# Patient Record
Sex: FEMALE | Race: WHITE
[De-identification: names, ages, dates, MRNs, and addresses within clinical notes are randomized per-mention and may not be internally consistent; named-entity substitution may affect disease eponyms.]

---

## 2020-05-22 ENCOUNTER — HOSPITAL ENCOUNTER (OUTPATIENT)
Dept: HOSPITAL 92 - L&D/OP | Age: 40
Discharge: HOME | End: 2020-05-22
Attending: FAMILY MEDICINE
Payer: COMMERCIAL

## 2020-05-22 VITALS — TEMPERATURE: 98.6 F | DIASTOLIC BLOOD PRESSURE: 81 MMHG | SYSTOLIC BLOOD PRESSURE: 134 MMHG

## 2020-05-22 VITALS — BODY MASS INDEX: 36.9 KG/M2

## 2020-05-22 DIAGNOSIS — E66.9: ICD-10-CM

## 2020-05-22 DIAGNOSIS — O99.89: Primary | ICD-10-CM

## 2020-05-22 DIAGNOSIS — Z3A.34: ICD-10-CM

## 2020-05-22 DIAGNOSIS — O99.213: ICD-10-CM

## 2020-05-22 DIAGNOSIS — O09.513: ICD-10-CM

## 2020-05-22 DIAGNOSIS — R03.0: ICD-10-CM

## 2020-05-22 PROCEDURE — 76819 FETAL BIOPHYS PROFIL W/O NST: CPT

## 2020-05-22 NOTE — PDOC.LDPN
Labor & Delivery Progress Note





- Subjective


Subjective: comfortable





- Objective


Vital signs reviewed and normal: yes


General: NAD, resting


-: 





Pt is a 40 yo  at 34.0 wks, dated by a 22.4 wk sono, here 2/2 BPP done 

with MFM, score of 4/8. They recommended her to be seen at the hospital:





# IUP


# AMA


# Late to care


-Pt seen by MFM today and noted a BPP, . Recommended her be seen at hospital 

for NST.


-NST reactive


-Repeat BPP , DIONTE ~7, DVT >2. Cephalic presentation. Discussed findings with 

patient.


-BP has been controlled here without any pressures >140/90. Encouraged patient 

to continue monitoring her BP at home


-Encouraged patient to f/u in clinic in 2 weeks for next appointment/GBS swab. 





Dispo: Due to reactive NST and BPP , patient will be discharged home. RTC 

precautions discussed. Patient agreeable with plan of care. 





PCP: Ruba JOSHUA

## 2020-05-22 NOTE — PDOC.FPROB
FMR OB H&P: HPI





- History of Present Illness


Chief Complaint: sent by [facility] for BPP 


Indentification: 38yo  at 34.0w by 22.4w US


History of Present Illness: 





 at 34.0 wks, LAMAR 7/3/20.  Pt presents to L&D after [facility] appt today were she 

had BPP , off for gross movement and flexion/extensions.  DIONTE: 5.4. [facility] 

recommended she be seen at hospital for NST and if category 1 then follow up 

with repeat BPP.  She reports good FM.  Denies vaginal discharge, vaginal 

bleeding, scotoma, cramping/ctx, LOF, and HA.  Hx of elevated pressures at home 

and monitors pressures at home and wnl.  She does not have a diagnosis of gHTN.

  








Primary Care Physician: 





NICK Yeh MD





FMR OB H&P: Current Pregnancy





- Prenatal Care


: 1


Para: 0


Gestational age: 34.0


Due date: 7/3/20


Dating Criteria: 22.4w US


Course/Complications: 





AMA, Hx of elevated BP but no dx of gHTN or PreE, late to care/poor dating





- OB Labs


Blood type: A


RH: positive


HIV: negative


RPR: negative


HepBsAg: negative


Rubella: immune


Quad screen: negative


H&H: 11.8/34.1


Platelets: 194





- Anatomy Survey


Anatomy survey: 


Normal anatomic Survey. 





FMR OB H&P: History





- Past Medical History


PMH: 





none





- OB History


OB History: 


AMA, hx of high blood pressures w/o diagnosis of gHTN, obesity





- GYN History


GYN History: 





none





- Surgical History


Sx History: 





Knee surgery, wrist surgery





- Social History


Social History: 





Denies smoking, alcohol, drugs





- Family History


Family History: 





non-contributory





FMR OB H&P: Medications





- Current


Home Medications: 


 











 Medication  Instructions  Recorded  Confirmed  Type


 


Omega-3 Fatty Acids/Fish Oil 1 each PO DAILY 20 History





[Omega 3 1,000 mg Softgel]    


 


Prenatal Vitamin 1 tablet PO DAILY 20 History











Allergies/Adverse Reactions: 


 Allergies











Allergy/AdvReac Type Severity Reaction Status Date / Time


 


No Known Allergies Allergy   Unverified 20 15:06














FMR OB H&P: ROS





- Review of Systems


General: denies: fever/chills, weight/appetite/sleep changes


ENT: denies: nasal congestion, rhinorrhea


Cardiovascular: denies: chest pain, palpitation, edema


Gastrointestinal: denies: abdominal pain, indigestion


Genitourinary (Female): denies: incontinence, dysuria, hematuria


Musculoskeletal: denies: pain, stiffness


Neurologic: denies: numbness, syncope


Integumentary: denies: itching, lesions


Psychological: denies: depression, anxiety





FMR OB H&P: Vital Signs





- Maternal


Vital signs: 





R-18, T98.6, BP- 134/78





- Fetal Heart Tones


Baseline: 140


Variability: moderate


Acceleration: absent


Deceleration: absent





FMR OB H&P: Physical Exam





- Physical Exam


General: NAD, awake, alert and oriented


HEENT: PERRLA, EOMI


Neck: FROM, no JVD


Heart: RRR, normal S1/S2, no murmurs/rubs/gallops, no edema


General: CTAB, no respiratory distress, good air movement, no wheezing


Abdomen: soft, gravid, non-tender


Musculoskeletal: pulses present, FROM in all four extremities


Neurological: cranial nerves II through XII intact, sensation to pain,touch and 

proprioception grossly normal


Skin: good tugor, capillary refill <2 seconds


Lymphatic: no purpura, no petechia


Psychiatric: intact recent and remote memory, good judgement and insight





FMR OB H&P: A/P





- Problem List


(1) Primigravida in third trimester


Current Visit: Yes   Status: Acute   Code(s): Z34.03 - ENCNTR FOR SUPRVSN OF 

NORMAL FIRST PREG, THIRD TRIMESTER   





(2) AMA (advanced maternal age) primigravida 35+


Current Visit: Yes   Status: Acute   Code(s): O09.519 - SUPERVISION OF ELDERLY 

PRIMIGRAVIDA, UNSPECIFIED TRIMESTER   





(3) Obesity


Current Visit: Yes   Status: Acute   Code(s): E66.9 - OBESITY, UNSPECIFIED   


Disposition: 





Pt is a 38 yo  at 34.0 wks, dated by a 22.4 wk sono, here secondary to a 

poor BPP done with MFM, . They recommended her to be seen at the hospital:





# IUP


# AMA


# Late to care


- pt seen by MFM today and noted a BPP, . Recommended her be seen at 

hospital for NST. If reactive then will need a BPP 4-6 hours after initial. 

Initial performed at 1200.  If negative they recommend steroids and delivery.  

She is currently doing well without complications.  She has history of elevated 

pressures but after extensive chart review by Dr. Garnica she does not have a 

diagnosis of gHTN. Her pre-e work up in the past is negative.





PCP: Ruba JOSHUA


Discussion: 


Date/Time: 20 1449











This H&P was discussed with  [] and  [] who agree with the above 

documentation and plan.








Addendum - Attending





- Attending Attestation


Date/Time: 20 175





I personally evaluated the patient and discussed the management with Dr. Zarate


I agree with the History, Examination, Assessment and Plan documented above 

with any addition or exceptions noted below - 38 yo female  @ 34 week sent 

by [facility] due to BPP . Was seeing MFM for AMA, obesity, and elevated BP at home 

but all pressures have been normal and growth ultrasound/BPP. Normal growth. 

Denies any ctx, LOF, VB (+)FM. FHTs- 140s/(+) accels/ no decels/ mod variability

- Category 1; [place]- no ctx. A/P: 1) IUP @34 weeks- NST reassuring. Will repeat 

BPP in 4-6 hours from initial one. If improved will d/c home. If BPP 6/10 or 

less will give steroids and move towards delivery.

## 2020-05-22 NOTE — ULT
ULTRASOUND BIOPHYSICAL FETAL PROFILE:

5/22/20

 

HISTORY: 

4-8 BPP earlier today, AMA. 

 

COMPARISON: 

None. 

 

FINDINGS: 

Real time gray scale, color and spectral analysis of the gravid uterus was performed for the evaluati
on of biophysical profile. 

 

Amniotic fluid index measures 7 cm. The heart rate is 123 beats per minute with a single viable intra
uterine pregnancy. The fetal position is vertex and the placenta is anterior. Biophysical profile sco
re is 8/8. 

 

IMPRESSION: 

Biophysical profile score of 8/8. 

 

POS: HOME

## 2020-06-25 ENCOUNTER — HOSPITAL ENCOUNTER (OUTPATIENT)
Dept: HOSPITAL 92 - SCSLAB | Age: 40
Discharge: HOME | End: 2020-06-25
Attending: FAMILY MEDICINE
Payer: COMMERCIAL

## 2020-06-25 DIAGNOSIS — Z11.59: ICD-10-CM

## 2020-06-25 DIAGNOSIS — Z01.812: Primary | ICD-10-CM

## 2020-06-25 PROCEDURE — 87635 SARS-COV-2 COVID-19 AMP PRB: CPT

## 2020-06-25 PROCEDURE — U0003 INFECTIOUS AGENT DETECTION BY NUCLEIC ACID (DNA OR RNA); SEVERE ACUTE RESPIRATORY SYNDROME CORONAVIRUS 2 (SARS-COV-2) (CORONAVIRUS DISEASE [COVID-19]), AMPLIFIED PROBE TECHNIQUE, MAKING USE OF HIGH THROUGHPUT TECHNOLOGIES AS DESCRIBED BY CMS-2020-01-R: HCPCS

## 2020-06-26 ENCOUNTER — HOSPITAL ENCOUNTER (INPATIENT)
Dept: HOSPITAL 92 - L&D/OP | Age: 40
LOS: 3 days | Discharge: HOME | End: 2020-06-29
Attending: FAMILY MEDICINE | Admitting: FAMILY MEDICINE
Payer: COMMERCIAL

## 2020-06-26 VITALS — BODY MASS INDEX: 38.5 KG/M2

## 2020-06-26 DIAGNOSIS — Z3A.38: ICD-10-CM

## 2020-06-26 DIAGNOSIS — O41.03X0: ICD-10-CM

## 2020-06-26 DIAGNOSIS — E66.9: ICD-10-CM

## 2020-06-26 LAB
ALBUMIN SERPL BCG-MCNC: 3.4 G/DL (ref 3.5–5)
ALP SERPL-CCNC: 152 U/L (ref 40–110)
ALT SERPL W P-5'-P-CCNC: 15 U/L (ref 8–55)
ANION GAP SERPL CALC-SCNC: 14 MMOL/L (ref 10–20)
AST SERPL-CCNC: 18 U/L (ref 5–34)
BASOPHILS # BLD AUTO: 0.1 THOU/UL (ref 0–0.2)
BASOPHILS NFR BLD AUTO: 0.8 % (ref 0–1)
BILIRUB SERPL-MCNC: 0.3 MG/DL (ref 0.2–1.2)
BUN SERPL-MCNC: 10 MG/DL (ref 7–18.7)
CALCIUM SERPL-MCNC: 9.1 MG/DL (ref 7.8–10.44)
CHLORIDE SERPL-SCNC: 108 MMOL/L (ref 98–107)
CO2 SERPL-SCNC: 19 MMOL/L (ref 22–29)
CREAT CL PREDICTED SERPL C-G-VRATE: 177 ML/MIN (ref 70–130)
EOSINOPHIL # BLD AUTO: 0.1 THOU/UL (ref 0–0.7)
EOSINOPHIL NFR BLD AUTO: 0.6 % (ref 0–10)
GLOBULIN SER CALC-MCNC: 2.8 G/DL (ref 2.4–3.5)
GLUCOSE SERPL-MCNC: 70 MG/DL (ref 70–105)
HBSAG INDEX: 0.19 S/CO (ref 0–0.99)
HGB BLD-MCNC: 13.9 G/DL (ref 12–16)
LYMPHOCYTES # BLD: 2.3 THOU/UL (ref 1.2–3.4)
LYMPHOCYTES NFR BLD AUTO: 19.4 % (ref 21–51)
MCH RBC QN AUTO: 32.1 PG (ref 27–31)
MCV RBC AUTO: 93.7 FL (ref 78–98)
MDIFF COMPLETE?: YES
MONOCYTES # BLD AUTO: 0.9 THOU/UL (ref 0.11–0.59)
MONOCYTES NFR BLD AUTO: 7.8 % (ref 0–10)
NEUTROPHILS # BLD AUTO: 8.5 THOU/UL (ref 1.4–6.5)
NEUTROPHILS NFR BLD AUTO: 71.4 % (ref 42–75)
PLATELET # BLD AUTO: 149 THOU/UL (ref 130–400)
POLYCHROMASIA BLD QL SMEAR: (no result) (100X)
POTASSIUM SERPL-SCNC: 4.4 MMOL/L (ref 3.5–5.1)
PROT UR-MCNC: (no result) MG/DL (ref 1–14)
RBC # BLD AUTO: 4.34 MILL/UL (ref 4.2–5.4)
SODIUM SERPL-SCNC: 137 MMOL/L (ref 136–145)
SYPHILIS ANTIBODY INDEX: 0.03 S/CO
URATE SERPL-MCNC: 5.1 MG/DL (ref 2.6–6)
WBC # BLD AUTO: 12 THOU/UL (ref 4.8–10.8)

## 2020-06-26 PROCEDURE — 86850 RBC ANTIBODY SCREEN: CPT

## 2020-06-26 PROCEDURE — 86901 BLOOD TYPING SEROLOGIC RH(D): CPT

## 2020-06-26 PROCEDURE — 84156 ASSAY OF PROTEIN URINE: CPT

## 2020-06-26 PROCEDURE — 87635 SARS-COV-2 COVID-19 AMP PRB: CPT

## 2020-06-26 PROCEDURE — 36415 COLL VENOUS BLD VENIPUNCTURE: CPT

## 2020-06-26 PROCEDURE — 86780 TREPONEMA PALLIDUM: CPT

## 2020-06-26 PROCEDURE — 87340 HEPATITIS B SURFACE AG IA: CPT

## 2020-06-26 PROCEDURE — 86900 BLOOD TYPING SEROLOGIC ABO: CPT

## 2020-06-26 PROCEDURE — 82570 ASSAY OF URINE CREATININE: CPT

## 2020-06-26 PROCEDURE — 82805 BLOOD GASES W/O2 SATURATION: CPT

## 2020-06-26 PROCEDURE — 84550 ASSAY OF BLOOD/URIC ACID: CPT

## 2020-06-26 PROCEDURE — 88307 TISSUE EXAM BY PATHOLOGIST: CPT

## 2020-06-26 PROCEDURE — 51702 INSERT TEMP BLADDER CATH: CPT

## 2020-06-26 PROCEDURE — U0003 INFECTIOUS AGENT DETECTION BY NUCLEIC ACID (DNA OR RNA); SEVERE ACUTE RESPIRATORY SYNDROME CORONAVIRUS 2 (SARS-COV-2) (CORONAVIRUS DISEASE [COVID-19]), AMPLIFIED PROBE TECHNIQUE, MAKING USE OF HIGH THROUGHPUT TECHNOLOGIES AS DESCRIBED BY CMS-2020-01-R: HCPCS

## 2020-06-26 PROCEDURE — 80053 COMPREHEN METABOLIC PANEL: CPT

## 2020-06-26 PROCEDURE — 85025 COMPLETE CBC W/AUTO DIFF WBC: CPT

## 2020-06-26 NOTE — PDOC.LDPN
Labor & Delivery Progress Note





- Subjective


Subjective: comfortable





- Objective


Vital signs reviewed and normal: yes


General: NAD, resting


FHT: category 1





- Assessment


(1) High risk pregnancy in primigravida greater than 35 years of age, antepartum


Code(s): O09.519 - SUPERVISION OF ELDERLY PRIMIGRAVIDA, UNSPECIFIED TRIMESTER   

Current Visit: Yes   Status: Acute   





(2) AMA (advanced maternal age) primigravida 35+


Code(s): O09.519 - SUPERVISION OF ELDERLY PRIMIGRAVIDA, UNSPECIFIED TRIMESTER   

Current Visit: No   Status: Acute   


Qualifiers: 


   Trimester: third trimester   Qualified Code(s): O09.513 - Supervision of 

elderly primigravida, third trimester   





(3) Gestational [pregnancy-induced] hypertension without significant proteinuria

, third trimester


Code(s): O13.3 - GESTATIONAL HTN W/O SIGNIFICANT PROTEINURIA, THIRD TRIMESTER   

Current Visit: Yes   Status: Acute   





(4) Oligohydramnios


Code(s): O41.00X0 - OLIGOHYDRAMNIOS, UNSP TRIMESTER, NOT APPLICABLE OR UNSP   

Current Visit: Yes   Status: Acute   


Qualifiers: 


   Fetus number: single or unspecified fetus   Trimester: third trimester   

Qualified Code(s): O41.03X0 - Oligohydramnios, third trimester, not applicable 

or unspecified   


Plan: continue plan of care


-: 





Cat 1 strip at 2130.  Cytotec placed at 1930. Will check patient again 2330.  

Pressure at 1730 WNL. Will re-check pressures. 





Addendum - Attending





- Attending Attestation


Date/Time: 20 2230





I personally evaluated the patient and discussed the management with Dr. Zarate


I agree with the History, Examination, Assessment and Plan documented above 

with any addition or exceptions noted below.





38 yo  female at 38.5 wks by 22.4 wk sono with gHTN and oligo admitted for 

IOL


Patient denies complaints. 


FHT cat 1. 


VS reviewed. Mild range BP to normotensive. 


Labs WNL with borderline  and uric acid 5.1


- mIOL: Continue with miso. Options discussed. 


- sIUP: IOB labs reviewed. Apos. Anatomy reviewed. Level II sono. 2T/3T 

negative. GBS negative. s/p Tdap. 


- AMA


- gHTN: Dx at 37 wks but patient declined delivery. Labs trended and remained 

WNL. Patient remains asymptomic. Elevated BP occasionally and has been mild 

range except severe range earlier today during  testing. Repeat labs 

at time of admission remain reassuring. Continue to closely monitor. Start mag 

if severe pressures return.


- oligo: Continue close monitoring of fetus. Currently cat 1 tracing. Discussed 

possible need for surgical delivery if infant not able to tolerate labor. 

Patient states understanding. 


- obesity





ABrayMD

## 2020-06-26 NOTE — PDOC.FPROB
FMR OB H&P: HPI





- History of Present Illness


Chief Complaint: Sent from clinic


Indentification: 38 yo G1


History of Present Illness: 


Ms. Cesar is a 39yoF who presents to L&D for evaluation of low DIONTE. She was 

seen in clinic today for weekly  testing and had 2 severe range 

pressures in clinic of systolic 171 and 160. Her US revealed an DIONTE of 2. She 

was sent over for evaluation. 


Primary Care Physician: 


TRES Yeh 





FMR OB H&P: Current Pregnancy





- Prenatal Care


: 1


Para: 0


Gestational age: 38.5 by 22.4wk US 


Due date: 20


Course/Complications: 


Intermittently elevated BPs. 





- OB Labs


Blood type: unknown


RH: unknown


Antibody Screen: unknown


HIV: negative


RPR: negative


HepBsAg: unknown


Quad screen: unknown


Gonorrhea: unknown


Chlamydia: unknown


A1c: 4.8


GBS: negative


Additional labs: 





varicella immune 


hep c neg 





FMR OB H&P: History





- Past Medical History


PMH: 


Denies





- OB History


OB History: 


G1 





- GYN History


GYN History: 


Denies





- Surgical History


Sx History: 


Knee surgeries, wrist surgery, lasix





- Social History


Social History: 


Alcohol use in 1T. Denies illicit drug use or tobacco use. 





- Family History


Family History: 


father prostate cancer.





FMR OB H&P: Medications





- Current


Home Medications: 


 











 Medication  Instructions  Recorded  Confirmed  Type


 


Omega-3 Fatty Acids/Fish Oil 1 each PO DAILY 20 History





[Omega 3 1,000 mg Softgel]    


 


Prenatal Vitamin 1 tablet PO DAILY 20 History











Allergies/Adverse Reactions: 


 Allergies











Allergy/AdvReac Type Severity Reaction Status Date / Time


 


No Known Allergies Allergy   Verified 20 16:41














FMR OB H&P: ROS





- Review of Systems


General: denies: fever/chills, weight/appetite/sleep changes, night sweats


Eyes: denies: eye pain, vision changes


ENT: denies: nasal congestion, rhinorrhea, frequent nose bleed, sore throat


Cardiovascular: denies: chest pain, palpitation, edema


Respiratory: denies: cough, congestion


Gastrointestinal: denies: abdominal pain, indigestion


Genitourinary (Female): denies: incontinence, dysuria, hematuria


Musculoskeletal: denies: pain, stiffness


Neurologic: denies: numbness, syncope


Integumentary: denies: itching, rash, lesions





FMR OB H&P: Vital Signs





- Maternal


Vital signs: 


 Vital Signs - First Documented











Temp Pulse Resp BP Pulse Ox


 


 98.6 F   87   18   140/89   98 


 


 20 16:36  20 16:36  20 16:36  20 16:36  20 16:36














- Fetal Heart Tones


Baseline: 130


Variability: moderate


Acceleration: present


Deceleration: absent


Category: category 1





FMR OB H&P: Physical Exam





- Physical Exam


General: NAD, awake, alert and oriented


HEENT: normocephalic and atraumatic, PERRLA, EOMI, MMM


Neck: supple, FROM


Heart: RRR, normal S1/S2, no murmurs/rubs/gallops


General: CTAB, no respiratory distress


Abdomen: soft, gravid


Musculoskeletal: normal gait and station, pulses present


Neurological: cranial nerves II through XII intact


Skin: no rash, good tugor


Lymphatic: no unusual bruising or bleeding


Psychiatric: intact recent and remote memory, good judgement and insight, 

normal mood and affect





- Pelvic Exam


SVE: 0/25/-3 @ 1730 


Membranes: intact 





FMR OB H&P: A/P


Disposition: 


Stable, admitting for induction of labor. 


Discussion: 


Date/Time: 20 1710





Term intrauterine pregnancy


Induction of labor for gHTN and oligohydramnios


- 38.5wks EGA by 22.4 wk US 


- 2 severe range pressures today in clinic and DIONTE of 2. 


- SVE Closed, 25% and -3. 


- Will admit to L&D and begin induction of labor with cytotec. 


- Closely monitor BPs. 


- Urine protein, creatinine, CBC, CMP, and uric acid ordered.











This H&P was discussed with Dr. Doherty & Dr. Adams who agree with the above 

documentation and plan.





Signature: 


Savannah KANG PGY1 





Addendum - Attending





- Attending Attestation


Date/Time: 20 0817





I personally and discussed the management with Dr. Bliss/Dougie.  Dr. Adams saw the patient personally.


I agree with the History, Examination, Assessment and Plan documented above 

with any addition or exceptions noted below.


At admission Dr rodriguez and I discussed whether to start Mag therapy.  The two e 

ere range pressures were only 5 minutes apart at the clinic.  Ms. Cesar was 

asymptomatic.  We determined to initiate Mag therapy if she had another severe 

range pressure or developed symptoms.

## 2020-06-27 LAB
ANALYZER IN CARDIO: (no result)
BASE EXCESS STD BLDA CALC-SCNC: -9.1 MEQ/L
BASE EXCESS STD BLDV CALC-SCNC: -8.3 MEQ/L
HCO3 BLDA-SCNC: 20.2 MEQ/L (ref 22–28)
HCO3 BLDV-SCNC: 18 MEQ/L (ref 22–28)
PH BLDV: 7.28 [PH] (ref 7.32–7.43)

## 2020-06-27 PROCEDURE — 0KQM0ZZ REPAIR PERINEUM MUSCLE, OPEN APPROACH: ICD-10-PCS | Performed by: FAMILY MEDICINE

## 2020-06-27 RX ADMIN — DOCUSATE CALCIUM SCH MG: 240 CAPSULE, LIQUID FILLED ORAL at 22:15

## 2020-06-27 RX ADMIN — CLINDAMYCIN PHOSPHATE SCH MLS: 900 INJECTION, SOLUTION INTRAVENOUS at 14:56

## 2020-06-27 RX ADMIN — CLINDAMYCIN PHOSPHATE SCH MLS: 900 INJECTION, SOLUTION INTRAVENOUS at 22:13

## 2020-06-27 NOTE — PDOC.LDPN
Labor & Delivery Progress Note





- Subjective


Subjective: comfortable





- Objective


General: NAD


Dilation: 10


Effacement: 100%


Station: -1


[place] contractions every: 2-4 min


IUPC placed: yes


FSE placed: yes


Plan: continue plan of care


-: 





40 yo  female at 38.6 wks by 22.4 wk sono with gHTN and oligo.





Term IUP


 CTX q2-4min. Epidural placed at 0330.


- Continue monitoring. 


- s/p cytotech x 2, progressing well


- Continue expectant management


- Two severe range pressures during CTX and epidural which resolved. Patient's 

BP has been consistently 130s/60s since epidural placed. No new symptoms such 

as headache, vision changes, NV, abd pain. Will continue to closely monitor 

BPs. Pre - E labs negative.


- Fetal tracing with intermittent minimal variability, few late decels seen. 

Seem to be correlated with lower maternal BP. Give 500ml bolus of D5LR and 

continue to monitor closely.


- 10/-1, prepare for delivery











Addendum - Attending





- Attending Attestation


Date/Time: 20 1214





I personally evaluated the patient and discussed the management with Dr. Bliss.


I agree with the History, Examination, Assessment and Plan documented above 

with any addition or exceptions noted below.

## 2020-06-27 NOTE — PDOC.LDPN
Labor & Delivery Progress Note





- Subjective


Subjective: comfortable (Had just received epidural. Prior to, was having very 

painful CTX. Now resting, comfortable. Denies any new symptoms such as NV, 

chest pain, palpitations, abdominal pain, headache, or vision changes. Patient 

states she feels more numb on L but it is starting to even out.)





- Objective


Vital signs reviewed and normal: yes (2 elevated BPs within 10 min during CTX 

and epidural, no new symptoms)


General: NAD, resting


Uterine fundus: non tender


Dilation: 4


Effacement: 100%


Station: -1


FHT: category 1


[place] contractions every: 2-3min


Resuscitative measures: maternal IV fluids





- Assessment


(1) Gestational [pregnancy-induced] hypertension without significant proteinuria

, third trimester


Code(s): O13.3 - GESTATIONAL HTN W/O SIGNIFICANT PROTEINURIA, THIRD TRIMESTER   

Current Visit: Yes   Status: Acute   





(2) High risk pregnancy in primigravida greater than 35 years of age, antepartum


Code(s): O09.519 - SUPERVISION OF ELDERLY PRIMIGRAVIDA, UNSPECIFIED TRIMESTER   

Current Visit: Yes   Status: Acute   





(3) Oligohydramnios


Code(s): O41.00X0 - OLIGOHYDRAMNIOS, UNSP TRIMESTER, NOT APPLICABLE OR UNSP   

Current Visit: Yes   Status: Acute   


Qualifiers: 


   Fetus number: single or unspecified fetus   Trimester: third trimester   

Qualified Code(s): O41.03X0 - Oligohydramnios, third trimester, not applicable 

or unspecified   





(4) AMA (advanced maternal age) primigravida 35+


Code(s): O09.519 - SUPERVISION OF ELDERLY PRIMIGRAVIDA, UNSPECIFIED TRIMESTER   

Current Visit: No   Status: Acute   


Qualifiers: 


   Trimester: third trimester   Qualified Code(s): O09.513 - Supervision of 

elderly primigravida, third trimester   





(5) Obesity


Code(s): E66.9 - OBESITY, UNSPECIFIED   Current Visit: No   Status: Acute   





(6) Primigravida in third trimester


Code(s): Z34.03 - ENCNTR FOR SUPRVSN OF NORMAL FIRST PREG, THIRD TRIMESTER   

Current Visit: No   Status: Acute   


Plan: continue plan of care


-: 


38 yo  female at 38.6 wks by 22.4 wk sono with gHTN and oligo.





Term IUP


SVE /-1, making change. CTX q2min. Cat 1 strip. Epidural placed at 0330.


- Continue monitoring. 


- Has had cytotech x 2, progressing well


- Continue expectant management


- Two severe range pressures during CTX and epidural which resolved. Patient's 

BP has been consistently 130s/60s since epidural placed. No new symptoms such 

as headache, vision changes, NV, abd pain. Will continue to closely monitor 

BPs. Pre - E labs negative.


- Next check @ 0800





Discussed with Dr. Garnica








Addendum - Attending





- Attending Attestation


Date/Time: 20 0658





I personally evaluated the patient and discussed the management with Dr. Willingham


I agree with the History, Examination, Assessment and Plan documented above 

with any addition or exceptions noted below.





Shortly after epidural placement, BP dropped. Fetal heart tones lost varibilty 

and for a short time decels present. IVF bolus now started along with maternal 

O2 and position changes. Cycle BP every 15 mins. Give adenergic due to 

sympathetic block from epidural as needed. 


Follow u closely. If not resolved, will need to call section for delivery. 





Jose

## 2020-06-27 NOTE — PDOC.LDPN
Labor & Delivery Progress Note





- Subjective


Subjective: comfortable





- Objective


General: NAD, resting


Plan: continue plan of care


-: 





Pt made little change to /2. Will continue with a second cytotec. Consider 

balloon on next check.  Pre-E labs negative. 





Dre Zarate DO





Addendum - Attending





- Attending Attestation


Date/Time: 20 2308





I personally evaluated the patient and discussed the management with Dr. Zarate


I agree with the History, Examination, Assessment and Plan documented above 

with any addition or exceptions noted below.





38 yo  female at 38.6 wks by 22.4 wk sono with gHTN and oligo


Asymptomic. 


SVE 1 cm


FHT cat 1


- Continue monitoring. 


- 2nd miso placed. 


- No s/sx of progression of gHTN to preE





ABrayMD

## 2020-06-28 RX ADMIN — DOCUSATE CALCIUM SCH MG: 240 CAPSULE, LIQUID FILLED ORAL at 21:55

## 2020-06-28 RX ADMIN — DOCUSATE CALCIUM SCH: 240 CAPSULE, LIQUID FILLED ORAL at 08:17

## 2020-06-28 RX ADMIN — Medication SCH ML: at 08:19

## 2020-06-28 RX ADMIN — Medication SCH: at 22:12

## 2020-06-28 RX ADMIN — CLINDAMYCIN PHOSPHATE SCH MLS: 900 INJECTION, SOLUTION INTRAVENOUS at 06:13

## 2020-06-28 NOTE — DN
DATE OF PROCEDURE:  2020



DELIVERING PHYSICIAN:  Haley Whaley, PGY-2



ATTENDING PHYSICIAN:  Omar Doherty MD



PROCEDURE PERFORMED:  Spontaneous vaginal delivery.



ANESTHESIA:  Epidural.



QUANTITATIVE BLOOD LOSS:  150 mL.



PREOPERATIVE DIAGNOSES:  

1. Term intrauterine pregnancy.

2. Oligohydramnios.

3. Elevated blood pressures in pregnancy.

4. Obesity.

5. Advanced maternal age.



POSTOPERATIVE DIAGNOSES:  

1. Term intrauterine pregnancy, delivered.

2. Oligohydramnios.

3. Elevated blood pressures in pregnancy.

4. Obesity.

5. Advanced maternal age.



INDICATIONS:  A 39-year-old, G1, P0, who presented for induction of labor after 
she

was found to have oligohydramnios and elevated blood pressures in the 170s 
systolic

in clinic. 



DELIVERY NOTE:  This is a 39-year-old female, G1, P0, at 38 and 6 weeks, who

delivered a viable female infant at 12:30.  Following an uneventful antepartum

course, a vigorous female was delivered over the peritoneum after midline 
episiotomy

was per performed due to soft tissue dystocia in occiput anterior position.

Anterior shoulder and then remainder of the body delivered.  Tight nuchal cord 
x1.

The head was held down, and mouth and nares were bulb suctioned.  Cord clamped 
and

cut, and cord blood and cord gas collected.  Placenta delivered intact in the

[place] position with a three-vessel cord noted.  Fundal massage was performed, 
and

the fundus was initially noted to have atony.  Therefore, 800 mcg of Cytotec was

placed.  She became firm with uterine massage and Cytotec.  The cervix and 
vagina

were inspected and only found to have second-degree perineal laceration 
secondary to

episiotomy.  This did not extend any.  This was repaired with 3-0 chromic in the

usual fashion with good approximation.  Infant went to the  nursery in 
good

condition for routine care.  Apgars were 8 and 9 at 1 and 5

minutes respectively.  The patient tolerated delivery well and went to 
postpartum

after routine recovery. 







Job ID:  [id number]



[facility]

## 2020-06-28 NOTE — PDOC.PP
Post Partum Progress Note


Post Partum Day #: 1


Subjective: 


Pain well controlled. Ambulating. Tolerating PO. Breastfeeding going well but 

would like to meet with lactation. No fever since delivery.





PO intake tolerated: yes


Flatus: yes


Ambulation: yes


 Vital Signs (12 hours)











  Temp Pulse Resp BP Pulse Ox


 


 20 23:57  98.2 F  92  16  104/55 L  95


 


 20 19:55  98.4 F  87  16  139/64  96








 Weight











Weight                         108.409 kg

















- Physical Examination


General: NAD


Cardiovascular: no m/r/g, RRR


Respiratory: clear to auscultation bilaterally, non-labored breathing


Abdominal: + bowel sounds, appropriately TTP


Fundus firm & at: umbilicus


Skin: no rash


Neurological: no gross focal deficits


Psychiatric: A&Ox3, normal affect


Result Diagrams: 


 20 18:52





 20 18:52


Additional Labs: 


 Post Partum Labs











Blood Type  A POSITIVE   20  20:12    


 


Hep Bs Antigen  Non-Reactive S/CO (NonReactive)   20  18:52    














- Assessment/Plan


38yo  delivered TAGA female on 





sIUP, delivered


- PPD #1


- Continue routine PP care


- Received cytotec after delivery, QBL 150ml. 


- Plans to breastfeed


- Will refer for tubal at PP visit





gHTN


- BPs <140/90


- Continue to  monitor








Addendum - Attending





- Attending Attestation


Date/Time: 20 1140





I personally evaluated the patient and discussed the management with Dr. Whaley. 


I agree with the History, Examination, Assessment and Plan documented above 

with any addition or exceptions noted below.

## 2020-06-29 VITALS — SYSTOLIC BLOOD PRESSURE: 129 MMHG | DIASTOLIC BLOOD PRESSURE: 59 MMHG | TEMPERATURE: 97.8 F

## 2020-06-29 RX ADMIN — DOCUSATE CALCIUM SCH MG: 240 CAPSULE, LIQUID FILLED ORAL at 08:21

## 2020-06-29 RX ADMIN — Medication SCH: at 07:35

## 2020-06-29 NOTE — PDOC.PP
Post Partum Progress Note


Post Partum Day #: 2


Subjective: 


Feeling well this morning. Breastfeeding and pumping. Ambulating. Tolerating 

PO. Minimal lochia.





PO intake tolerated: yes


Flatus: yes


Ambulation: yes


 Vital Signs (12 hours)











  Temp Pulse Resp BP Pulse Ox


 


 20 20:27  98.4 F  80  18  139/75  99








 Weight











Weight                         108.409 kg

















- Physical Examination


General: NAD


Cardiovascular: no m/r/g, RRR


Respiratory: clear to auscultation bilaterally, non-labored breathing


Abdominal: + bowel sounds, lochia


Fundus firm & at: below umbilicus


Skin: no rash


Neurological: no gross focal deficits


Psychiatric: A&Ox3, normal affect


Result Diagrams: 


 20 18:52





 20 18:52


Additional Labs: 


 Post Partum Labs











Blood Type  A POSITIVE   20  20:12    


 


Hep Bs Antigen  Non-Reactive S/CO (NonReactive)   20  18:52    














- Assessment/Plan


38yo  delivered TAG[employer] female on 





sIUP, delivered


- PPD #2


- Continue routine PP care


- Received cytotec after delivery, QBL 150ml. 


- Breastfeeding


- Will refer for tubal at PP visit





gHTN


- BPs <140/90


- Continue to monitor


- Follow up in clinic in 1 wk








Addendum - Attending





- Attending Attestation


Date/Time: 20 1206





I personally evaluated the patient and discussed the management with the team.


I agree with the History, Examination, Assessment and Plan documented above 

with any addition or exceptions noted below.

## 2021-04-02 ENCOUNTER — HOSPITAL ENCOUNTER (OUTPATIENT)
Dept: HOSPITAL 92 - LABBT | Age: 41
Discharge: HOME | End: 2021-04-02
Attending: UROLOGY
Payer: COMMERCIAL

## 2021-04-02 DIAGNOSIS — N20.1: ICD-10-CM

## 2021-04-02 DIAGNOSIS — R35.0: ICD-10-CM

## 2021-04-02 DIAGNOSIS — Z01.818: Primary | ICD-10-CM

## 2021-04-02 DIAGNOSIS — Z20.822: ICD-10-CM

## 2021-04-02 LAB
ANION GAP SERPL CALC-SCNC: 13 MMOL/L (ref 10–20)
APTT PPP: 26.3 SEC (ref 22–33)
BUN SERPL-MCNC: 13 MG/DL (ref 7–18.7)
CALCIUM SERPL-MCNC: 9.1 MG/DL (ref 7.8–10.44)
CHLORIDE SERPL-SCNC: 106 MMOL/L (ref 98–107)
CO2 SERPL-SCNC: 24 MMOL/L (ref 22–29)
CREAT CL PREDICTED SERPL C-G-VRATE: 0 ML/MIN (ref 70–130)
GLUCOSE SERPL-MCNC: 87 MG/DL (ref 70–105)
HGB BLD-MCNC: 14 G/DL (ref 12–15.5)
INR PPP: 0.9
MCH RBC QN AUTO: 30.6 PG (ref 27–33)
MCV RBC AUTO: 90.4 FL (ref 81.6–98.3)
PLATELET # BLD AUTO: 246 10X3/UL (ref 150–450)
POTASSIUM SERPL-SCNC: 4.4 MMOL/L (ref 3.5–5.1)
PROTHROMBIN TIME: 10.4 SEC (ref 9.5–12.1)
RBC # BLD AUTO: 4.58 10X6/UL (ref 3.9–5.03)
SODIUM SERPL-SCNC: 139 MMOL/L (ref 136–145)
WBC # BLD AUTO: 5.4 10X3/UL (ref 3.5–10.5)

## 2021-04-02 PROCEDURE — U0005 INFEC AGEN DETEC AMPLI PROBE: HCPCS

## 2021-04-02 PROCEDURE — 93010 ELECTROCARDIOGRAM REPORT: CPT

## 2021-04-02 PROCEDURE — U0003 INFECTIOUS AGENT DETECTION BY NUCLEIC ACID (DNA OR RNA); SEVERE ACUTE RESPIRATORY SYNDROME CORONAVIRUS 2 (SARS-COV-2) (CORONAVIRUS DISEASE [COVID-19]), AMPLIFIED PROBE TECHNIQUE, MAKING USE OF HIGH THROUGHPUT TECHNOLOGIES AS DESCRIBED BY CMS-2020-01-R: HCPCS

## 2021-04-02 PROCEDURE — 85730 THROMBOPLASTIN TIME PARTIAL: CPT

## 2021-04-02 PROCEDURE — 87635 SARS-COV-2 COVID-19 AMP PRB: CPT

## 2021-04-02 PROCEDURE — 80048 BASIC METABOLIC PNL TOTAL CA: CPT

## 2021-04-02 PROCEDURE — 85027 COMPLETE CBC AUTOMATED: CPT

## 2021-04-02 PROCEDURE — 93005 ELECTROCARDIOGRAM TRACING: CPT

## 2021-04-02 PROCEDURE — 85610 PROTHROMBIN TIME: CPT

## 2021-10-22 ENCOUNTER — HOSPITAL ENCOUNTER (OUTPATIENT)
Dept: HOSPITAL 92 - CSHMAMMO | Age: 41
Discharge: HOME | End: 2021-10-22
Payer: COMMERCIAL

## 2021-10-22 DIAGNOSIS — Z12.31: Primary | ICD-10-CM

## 2021-10-22 PROCEDURE — 77067 SCR MAMMO BI INCL CAD: CPT

## 2021-10-22 PROCEDURE — 77063 BREAST TOMOSYNTHESIS BI: CPT
